# Patient Record
Sex: FEMALE | Race: WHITE | Employment: OTHER | URBAN - METROPOLITAN AREA
[De-identification: names, ages, dates, MRNs, and addresses within clinical notes are randomized per-mention and may not be internally consistent; named-entity substitution may affect disease eponyms.]

---

## 2019-12-26 ENCOUNTER — HOSPITAL ENCOUNTER (EMERGENCY)
Age: 74
Discharge: HOME | End: 2019-12-26
Attending: EMERGENCY MEDICINE
Payer: MEDICARE

## 2019-12-26 ENCOUNTER — APPOINTMENT (OUTPATIENT)
Dept: CT IMAGING | Age: 74
End: 2019-12-26
Payer: MEDICARE

## 2019-12-26 VITALS
HEART RATE: 66 BPM | TEMPERATURE: 97.6 F | HEIGHT: 65 IN | DIASTOLIC BLOOD PRESSURE: 86 MMHG | RESPIRATION RATE: 14 BRPM | BODY MASS INDEX: 27.49 KG/M2 | WEIGHT: 165 LBS | SYSTOLIC BLOOD PRESSURE: 158 MMHG | OXYGEN SATURATION: 96 %

## 2019-12-26 DIAGNOSIS — S01.01XA LACERATION OF SCALP, INITIAL ENCOUNTER: ICD-10-CM

## 2019-12-26 DIAGNOSIS — S09.90XA CLOSED HEAD INJURY, INITIAL ENCOUNTER: Primary | ICD-10-CM

## 2019-12-26 PROCEDURE — 90471 IMMUNIZATION ADMIN: CPT | Performed by: EMERGENCY MEDICINE

## 2019-12-26 PROCEDURE — 6360000002 HC RX W HCPCS: Performed by: EMERGENCY MEDICINE

## 2019-12-26 PROCEDURE — 70450 CT HEAD/BRAIN W/O DYE: CPT

## 2019-12-26 PROCEDURE — 12011 RPR F/E/E/N/L/M 2.5 CM/<: CPT

## 2019-12-26 PROCEDURE — 99284 EMERGENCY DEPT VISIT MOD MDM: CPT

## 2019-12-26 PROCEDURE — 90715 TDAP VACCINE 7 YRS/> IM: CPT | Performed by: EMERGENCY MEDICINE

## 2019-12-26 RX ADMIN — TETANUS TOXOID, REDUCED DIPHTHERIA TOXOID AND ACELLULAR PERTUSSIS VACCINE, ADSORBED 0.5 ML: 5; 2.5; 8; 8; 2.5 SUSPENSION INTRAMUSCULAR at 06:53

## 2019-12-26 ASSESSMENT — PAIN DESCRIPTION - DESCRIPTORS: DESCRIPTORS: THROBBING

## 2019-12-26 ASSESSMENT — ENCOUNTER SYMPTOMS
WHEEZING: 0
SHORTNESS OF BREATH: 0
SORE THROAT: 0
COUGH: 0
PHOTOPHOBIA: 0
DIARRHEA: 0
BACK PAIN: 0
RHINORRHEA: 0
VOMITING: 0
SINUS PAIN: 0
NAUSEA: 0
SINUS PRESSURE: 0
ABDOMINAL PAIN: 0
CONSTIPATION: 0

## 2019-12-26 ASSESSMENT — PAIN DESCRIPTION - ONSET: ONSET: ON-GOING

## 2019-12-26 ASSESSMENT — PAIN SCALES - GENERAL: PAINLEVEL_OUTOF10: 8

## 2019-12-26 ASSESSMENT — PAIN DESCRIPTION - FREQUENCY: FREQUENCY: CONTINUOUS

## 2019-12-26 ASSESSMENT — PAIN DESCRIPTION - LOCATION: LOCATION: HEAD

## 2019-12-26 ASSESSMENT — PAIN DESCRIPTION - PAIN TYPE: TYPE: ACUTE PAIN

## 2019-12-26 ASSESSMENT — PAIN DESCRIPTION - ORIENTATION: ORIENTATION: RIGHT

## 2019-12-26 ASSESSMENT — PAIN DESCRIPTION - PROGRESSION: CLINICAL_PROGRESSION: NOT CHANGED

## 2020-07-25 ENCOUNTER — TELEPHONE ENCOUNTER (OUTPATIENT)
Dept: URBAN - METROPOLITAN AREA CLINIC 13 | Facility: CLINIC | Age: 75
End: 2020-07-25

## 2020-07-25 RX ORDER — METRONIDAZOLE 500 MG/1
TAKE 1 TABLET TWICE DAILY UNTIL FINISHED TABLET ORAL
Qty: 20 | Refills: 0 | OUTPATIENT
Start: 2016-05-23 | End: 2016-06-28

## 2020-07-25 RX ORDER — VANCOMYCIN HYDROCHLORIDE 125 MG/1
TAKE 1 CAPSULE 4 TIMES DAILY CAPSULE ORAL
Qty: 40 | Refills: 1 | OUTPATIENT
Start: 2016-06-02 | End: 2016-06-28

## 2020-07-26 ENCOUNTER — TELEPHONE ENCOUNTER (OUTPATIENT)
Dept: URBAN - METROPOLITAN AREA CLINIC 13 | Facility: CLINIC | Age: 75
End: 2020-07-26

## 2020-07-26 RX ORDER — MULTIVITAMIN
TAKE 1 CAPSULE DAILY CAPSULE ORAL
Refills: 0 | Status: ACTIVE | COMMUNITY

## 2022-01-12 ENCOUNTER — OFFICE VISIT (OUTPATIENT)
Dept: URBAN - METROPOLITAN AREA CLINIC 107 | Facility: CLINIC | Age: 77
End: 2022-01-12

## 2022-03-14 ENCOUNTER — LAB OUTSIDE AN ENCOUNTER (OUTPATIENT)
Dept: URBAN - METROPOLITAN AREA CLINIC 107 | Facility: CLINIC | Age: 77
End: 2022-03-14

## 2022-03-14 ENCOUNTER — WEB ENCOUNTER (OUTPATIENT)
Dept: URBAN - METROPOLITAN AREA CLINIC 107 | Facility: CLINIC | Age: 77
End: 2022-03-14

## 2022-03-14 ENCOUNTER — OFFICE VISIT (OUTPATIENT)
Dept: URBAN - METROPOLITAN AREA CLINIC 107 | Facility: CLINIC | Age: 77
End: 2022-03-14
Payer: MEDICARE

## 2022-03-14 VITALS
SYSTOLIC BLOOD PRESSURE: 171 MMHG | DIASTOLIC BLOOD PRESSURE: 95 MMHG | BODY MASS INDEX: 28.32 KG/M2 | HEART RATE: 92 BPM | TEMPERATURE: 98.1 F | WEIGHT: 170 LBS | HEIGHT: 65 IN | RESPIRATION RATE: 18 BRPM

## 2022-03-14 DIAGNOSIS — M33.90 DERMATOMYOSITIS: ICD-10-CM

## 2022-03-14 DIAGNOSIS — Z80.0 FAMILY HISTORY OF COLON CANCER: ICD-10-CM

## 2022-03-14 DIAGNOSIS — K21.9 GASTROESOPHAGEAL REFLUX DISEASE, UNSPECIFIED WHETHER ESOPHAGITIS PRESENT: ICD-10-CM

## 2022-03-14 PROCEDURE — 99214 OFFICE O/P EST MOD 30 MIN: CPT | Performed by: INTERNAL MEDICINE

## 2022-03-14 RX ORDER — HYDROXYCHLOROQUINE SULFATE 200 MG/1
AS DIRECTED TABLET, FILM COATED ORAL TWICE A DAY
Status: ACTIVE | COMMUNITY

## 2022-03-14 RX ORDER — POLYETHYLENE GLYCOL 3350, SODIUM CHLORIDE, SODIUM BICARBONATE, POTASSIUM CHLORIDE 420; 11.2; 5.72; 1.48 G/4L; G/4L; G/4L; G/4L
AS DIRECTED POWDER, FOR SOLUTION ORAL ONCE
Qty: 1 KIT | Refills: 0 | OUTPATIENT
Start: 2022-03-14 | End: 2022-03-15

## 2022-03-14 RX ORDER — LEVOCETIRIZINE DIHYDROCHLORIDE 5 MG/1
1 TABLET IN THE EVENING TABLET ORAL ONCE A DAY
Status: ACTIVE | COMMUNITY

## 2022-03-14 RX ORDER — MULTIVITAMIN
TAKE 1 CAPSULE DAILY CAPSULE ORAL
Refills: 0 | Status: ACTIVE | COMMUNITY

## 2022-03-14 NOTE — HPI-TODAY'S VISIT:
Ms. Borrero is C. difficile diarrhea and multiple second-degree relatives with colorectal cancer referred by Dr. Dillon for recent diagnosis of dermatomyositis.  In September 2020 she developed a pruritic erythematous rash on her torso and face.  She was seen by dermatology but eventually was evaluated by Dr. Dillon secondary to some associated joint pain.  She was diagnosed with amyopathic dermatomyositis.  Laboratories were notable for positiveanti-PL7 and anti-TIF-1r, the latter having a 78% sensitivity and 89% specificity with cancer.  She was started on hydroxychloroquine.  She reports that her pruritus and rash have resolved.  Her family history is notable for 2 first cousins with colorectal cancer.  Her last colonoscopy on 11/17/2015 by Dr. Miramontes was normal.  She has 3-4 bowel movements daily since taking the hydroxychloroquine.  She denies any red blood per rectum.  She also has a history of heartburn but no dysphagia.  Her weight has been stable.  She is not undergoing upper endoscopy.  She was last seen in our office by Dr. No for evaluation of diarrhea and C. difficle.  She was treated successfully with oral vancomycin.

## 2022-04-06 ENCOUNTER — CLAIMS CREATED FROM THE CLAIM WINDOW (OUTPATIENT)
Dept: URBAN - METROPOLITAN AREA CLINIC 4 | Facility: CLINIC | Age: 77
End: 2022-04-06
Payer: MEDICARE

## 2022-04-06 ENCOUNTER — OFFICE VISIT (OUTPATIENT)
Dept: URBAN - METROPOLITAN AREA SURGERY CENTER 25 | Facility: SURGERY CENTER | Age: 77
End: 2022-04-06
Payer: MEDICARE

## 2022-04-06 DIAGNOSIS — K29.80 PEPTIC DUODENITIS: ICD-10-CM

## 2022-04-06 DIAGNOSIS — K22.8 OTHER SPECIFIED DISEASES OF ESOPHAGUS: ICD-10-CM

## 2022-04-06 DIAGNOSIS — K21.9 GASTROESOPHAGEAL REFLUX DISEASE: ICD-10-CM

## 2022-04-06 DIAGNOSIS — K29.81 DUODENITIS WITH BLEEDING: ICD-10-CM

## 2022-04-06 DIAGNOSIS — K31.89 FOCAL FOVEOLAR HYPERPLASIA: ICD-10-CM

## 2022-04-06 PROCEDURE — 88312 SPECIAL STAINS GROUP 1: CPT | Performed by: PATHOLOGY

## 2022-04-06 PROCEDURE — 88305 TISSUE EXAM BY PATHOLOGIST: CPT | Performed by: PATHOLOGY

## 2022-04-06 PROCEDURE — 43239 EGD BIOPSY SINGLE/MULTIPLE: CPT | Performed by: INTERNAL MEDICINE

## 2022-04-06 PROCEDURE — G8907 PT DOC NO EVENTS ON DISCHARG: HCPCS | Performed by: INTERNAL MEDICINE

## 2022-04-06 RX ORDER — HYDROXYCHLOROQUINE SULFATE 200 MG/1
AS DIRECTED TABLET, FILM COATED ORAL TWICE A DAY
Status: ACTIVE | COMMUNITY

## 2022-04-06 RX ORDER — MULTIVITAMIN
TAKE 1 CAPSULE DAILY CAPSULE ORAL
Refills: 0 | Status: ACTIVE | COMMUNITY

## 2022-04-06 RX ORDER — LEVOCETIRIZINE DIHYDROCHLORIDE 5 MG/1
1 TABLET IN THE EVENING TABLET ORAL ONCE A DAY
Status: ACTIVE | COMMUNITY

## 2022-04-26 ENCOUNTER — OFFICE VISIT (OUTPATIENT)
Dept: URBAN - METROPOLITAN AREA SURGERY CENTER 25 | Facility: SURGERY CENTER | Age: 77
End: 2022-04-26
Payer: MEDICARE

## 2022-04-26 ENCOUNTER — CLAIMS CREATED FROM THE CLAIM WINDOW (OUTPATIENT)
Dept: URBAN - METROPOLITAN AREA CLINIC 4 | Facility: CLINIC | Age: 77
End: 2022-04-26
Payer: MEDICARE

## 2022-04-26 DIAGNOSIS — Z80.0 FAMILY HISTORY OF COLON CANCER: ICD-10-CM

## 2022-04-26 DIAGNOSIS — M33.90 DERMATOMYOSITIS: ICD-10-CM

## 2022-04-26 DIAGNOSIS — D12.2 BENIGN NEOPLASM OF ASCENDING COLON: ICD-10-CM

## 2022-04-26 DIAGNOSIS — K57.30 ACQUIRED DIVERTICULOSIS OF COLON: ICD-10-CM

## 2022-04-26 DIAGNOSIS — D12.2 ADENOMA OF ASCENDING COLON: ICD-10-CM

## 2022-04-26 PROCEDURE — G8907 PT DOC NO EVENTS ON DISCHARG: HCPCS | Performed by: INTERNAL MEDICINE

## 2022-04-26 PROCEDURE — 88305 TISSUE EXAM BY PATHOLOGIST: CPT | Performed by: PATHOLOGY

## 2022-04-26 PROCEDURE — 45385 COLONOSCOPY W/LESION REMOVAL: CPT | Performed by: INTERNAL MEDICINE

## 2022-04-26 RX ORDER — MULTIVITAMIN
TAKE 1 CAPSULE DAILY CAPSULE ORAL
Refills: 0 | Status: ACTIVE | COMMUNITY

## 2022-04-26 RX ORDER — HYDROXYCHLOROQUINE SULFATE 200 MG/1
AS DIRECTED TABLET, FILM COATED ORAL TWICE A DAY
Status: ACTIVE | COMMUNITY

## 2022-04-26 RX ORDER — LEVOCETIRIZINE DIHYDROCHLORIDE 5 MG/1
1 TABLET IN THE EVENING TABLET ORAL ONCE A DAY
Status: ACTIVE | COMMUNITY

## 2022-05-11 ENCOUNTER — OFFICE VISIT (OUTPATIENT)
Dept: URBAN - METROPOLITAN AREA CLINIC 113 | Facility: CLINIC | Age: 77
End: 2022-05-11

## 2022-05-12 ENCOUNTER — OFFICE VISIT (OUTPATIENT)
Dept: URBAN - METROPOLITAN AREA CLINIC 107 | Facility: CLINIC | Age: 77
End: 2022-05-12
Payer: MEDICARE

## 2022-05-12 VITALS
SYSTOLIC BLOOD PRESSURE: 153 MMHG | DIASTOLIC BLOOD PRESSURE: 82 MMHG | BODY MASS INDEX: 27.82 KG/M2 | HEART RATE: 76 BPM | HEIGHT: 65 IN | WEIGHT: 167 LBS | TEMPERATURE: 98.3 F | RESPIRATION RATE: 18 BRPM

## 2022-05-12 DIAGNOSIS — M33.90 DERMATOMYOSITIS: ICD-10-CM

## 2022-05-12 DIAGNOSIS — Z86.010 HX OF ADENOMATOUS COLONIC POLYPS: ICD-10-CM

## 2022-05-12 DIAGNOSIS — Z80.0 FAMILY HISTORY OF COLON CANCER: ICD-10-CM

## 2022-05-12 DIAGNOSIS — K21.00 GASTROESOPHAGEAL REFLUX DISEASE WITH ESOPHAGITIS WITHOUT HEMORRHAGE: ICD-10-CM

## 2022-05-12 DIAGNOSIS — K20.80 LOS ANGELES GRADE B ESOPHAGITIS: ICD-10-CM

## 2022-05-12 PROCEDURE — 99213 OFFICE O/P EST LOW 20 MIN: CPT | Performed by: PHYSICIAN ASSISTANT

## 2022-05-12 RX ORDER — OMEPRAZOLE 40 MG/1
1 CAPSULE 30 MINUTES BEFORE MORNING MEAL CAPSULE, DELAYED RELEASE ORAL ONCE A DAY
Qty: 30 | OUTPATIENT
Start: 2022-05-31

## 2022-05-12 RX ORDER — HYDROXYCHLOROQUINE SULFATE 200 MG/1
AS DIRECTED TABLET, FILM COATED ORAL TWICE A DAY
Status: ACTIVE | COMMUNITY

## 2022-05-12 RX ORDER — MULTIVITAMIN
TAKE 1 CAPSULE DAILY CAPSULE ORAL
Refills: 0 | Status: ACTIVE | COMMUNITY

## 2022-05-12 RX ORDER — LEVOCETIRIZINE DIHYDROCHLORIDE 5 MG/1
1 TABLET IN THE EVENING TABLET ORAL ONCE A DAY
Status: ACTIVE | COMMUNITY

## 2022-05-12 NOTE — HPI-TODAY'S VISIT:
Ms. Borrero is a 77-year-old woman with a history of C. difficile diarrhea and multiple second-degree relatives with colorectal cancer, presenting for follow-up after undergoing colonoscopy and EGD. She was last seen on 3/14/2022 to discuss colonoscopy given her newly diagnosed dermatomyositis (with positive biomarkers TIF 1-Gamma).  She is also planned for EGD at that time as she reported complaints of persistent GERD symptoms. EGD (4/6/2022): [place] grade B reflux esophagitis with no bleeding.  Small hiatal hernia.  Nonerosive gastropathy.  Patchy duodenal erythema with scattered erosions.  Duodenal biopsies revealed active peptic duodenitis without evidence of infectious organisms or sprue.  Negative for dysplasia or malignancy.  Stomach antrum and body biopsies demonstrated no significant abnormality.  GE junction biopsies revealed gastric type mucosa without significant abnormality; no squamous mucosa identified.  No evidence of eosinophilic esophagitis. Colonoscopy (4/26/2022): [place] bowel preparation scale score of 9.  A 5 mm polyp was removed from the ascending colon.  Diverticulosis in the sigmoid colon.  Pathology revealed this to be a tubular adenoma.  A repeat colonoscopy is recommended in 7 years as part of colon cancer prevention. Today she presents in follow-up stating that she is feeling fairly well overall.  She has an extensive list of questions regarding her colonoscopy findings and upper endoscopy findings.  SHe continues to have mild heartburn. She is not on any medications for this. No difficulty swallowing or regurgitation.  She denies any nausea, vomiting, abdominal pain, early satiety, fevers or chills.  Her bowel habits are regular and normal consistency.  She has up to 4 soft, formed bowel movements daily with taking hydroxychloroquine. No red blood per rectum or melena.

## 2022-05-31 PROBLEM — 396230008: Status: ACTIVE | Noted: 2022-03-14

## 2022-05-31 PROBLEM — 312824007: Status: ACTIVE | Noted: 2022-03-14

## 2022-05-31 PROBLEM — 429047008: Status: ACTIVE | Noted: 2022-05-31

## 2022-06-17 ENCOUNTER — OFFICE VISIT (OUTPATIENT)
Dept: URBAN - METROPOLITAN AREA CLINIC 107 | Facility: CLINIC | Age: 77
End: 2022-06-17

## 2022-06-17 NOTE — HPI-OTHER HISTORIES
EGD (4/6/2022): LA grade B reflux esophagitis with no bleeding.  Small hiatal hernia.  Nonerosive gastropathy.  Patchy duodenal erythema with scattered erosions.  Duodenal biopsies revealed active peptic duodenitis without evidence of infectious organisms or sprue.  Negative for dysplasia or malignancy.  Stomach antrum and body biopsies demonstrated no significant abnormality.  GE junction biopsies revealed gastric type mucosa without significant abnormality; no squamous mucosa identified.  No evidence of eosinophilic esophagitis. Colonoscopy (4/26/2022): [place] bowel preparation scale score of 9.  A 5 mm polyp was removed from the ascending colon.  Diverticulosis in the sigmoid colon.  Pathology revealed this to be a tubular adenoma.  A repeat colonoscopy is recommended in 7 years as part of colon cancer prevention.

## 2022-08-24 ENCOUNTER — OFFICE VISIT (OUTPATIENT)
Dept: URBAN - METROPOLITAN AREA CLINIC 107 | Facility: CLINIC | Age: 77
End: 2022-08-24
Payer: MEDICARE

## 2022-08-24 VITALS
SYSTOLIC BLOOD PRESSURE: 173 MMHG | RESPIRATION RATE: 18 BRPM | DIASTOLIC BLOOD PRESSURE: 84 MMHG | BODY MASS INDEX: 27.49 KG/M2 | TEMPERATURE: 97.6 F | HEART RATE: 84 BPM | WEIGHT: 165 LBS | HEIGHT: 65 IN

## 2022-08-24 DIAGNOSIS — K21.00 GASTROESOPHAGEAL REFLUX DISEASE WITH ESOPHAGITIS WITHOUT HEMORRHAGE: ICD-10-CM

## 2022-08-24 DIAGNOSIS — R74.8 ELEVATED LIVER ENZYMES: ICD-10-CM

## 2022-08-24 PROCEDURE — 99214 OFFICE O/P EST MOD 30 MIN: CPT | Performed by: INTERNAL MEDICINE

## 2022-08-24 RX ORDER — OMEPRAZOLE 40 MG/1
1 CAPSULE 30 MINUTES BEFORE MORNING MEAL CAPSULE, DELAYED RELEASE ORAL ONCE A DAY
Qty: 30 | Status: ACTIVE | COMMUNITY
Start: 2022-05-31

## 2022-08-24 RX ORDER — LEVOCETIRIZINE DIHYDROCHLORIDE 5 MG/1
1 TABLET IN THE EVENING TABLET ORAL ONCE A DAY
Status: ACTIVE | COMMUNITY

## 2022-08-24 RX ORDER — MULTIVITAMIN
TAKE 1 CAPSULE DAILY CAPSULE ORAL
Refills: 0 | Status: ACTIVE | COMMUNITY

## 2022-08-24 RX ORDER — HYDROXYCHLOROQUINE SULFATE 200 MG/1
1 TABLET TABLET, FILM COATED ORAL TWICE A DAY
Status: ACTIVE | COMMUNITY

## 2022-08-24 RX ORDER — OMEPRAZOLE 40 MG/1
1 CAPSULE 30 MINUTES BEFORE MORNING MEAL CAPSULE, DELAYED RELEASE ORAL ONCE A DAY
OUTPATIENT
Start: 2022-05-31

## 2022-08-24 NOTE — HPI-OTHER HISTORIES
EGD (4/6/2022): LA grade B reflux esophagitis with no bleeding.  Small hiatal hernia.  Nonerosive gastropathy.  Patchy duodenal erythema with scattered erosions.  Duodenal biopsies revealed active peptic duodenitis without evidence of infectious organisms or sprue.  Negative for dysplasia or malignancy.  Stomach antrum and body biopsies demonstrated no significant abnormality.  GE junction biopsies revealed gastric type mucosa without significant abnormality; no squamous mucosa identified.  No evidence of eosinophilic esophagitis. Colonoscopy (4/26/2022): [place] bowel preparation scale score of 9.  A 5 mm polyp was removed from the ascending colon.  Diverticulosis in the sigmoid colon.  Pathology revealed this to be a tubular adenoma.  A repeat colonoscopy is recommended in 7 years as part of colon cancer prevention.

## 2022-08-24 NOTE — HPI-TODAY'S VISIT:
Ms Borrero is a 77-year-old woman with a history of C. difficile diarrhea and multiple second-degree relatives with colorectal cancer presents for follow-up.  She was last seen on 5/12/2022 for follow-up regarding GERD with esophagitis and dermatomyositis status post EGD and colonoscopy to evaluate for possible occult neoplasm.  The EGD was notable for mild reflux esophagitis, small hiatal hernia and mild acid peptic duodenitis without evidence of H. pylori.  The colonoscopy was unremarkable except for removal of a low grade tubular adenoma.  A surveillance colonoscopy is recommended in 7 years.  She was recommended to begin omeprazole 40 mg daily for GERD symptoms.  She returns for follow-up overall doing fairly well.  We again discussed results of the EGD and colonoscopy in detail.  She had an abdominal ultrasound that reportedly showed increased hepatic echogenicity suggestive of steatosis versus fibrosis.  She denies any nausea, vomiting, heartburn, dysphagia, weight loss, abdominal pain.  Her bowel habits are regular, normal consistency without any red blood per rectum.  No fevers.  Labs on 4/5/2022 are notable for WBC 4.3, hemoglobin 14.5, MCV 91, platelets 284, normal basic metabolic panel, total bilirubin 0.6, alkaline phosphatase 69, AST 30, ALT 35, albumin 4.6, total protein 7.6.

## 2022-09-08 PROBLEM — 266433003: Status: ACTIVE | Noted: 2022-05-31

## 2022-09-09 ENCOUNTER — TELEPHONE ENCOUNTER (OUTPATIENT)
Dept: URBAN - METROPOLITAN AREA CLINIC 107 | Facility: CLINIC | Age: 77
End: 2022-09-09

## 2022-09-26 ENCOUNTER — TELEPHONE ENCOUNTER (OUTPATIENT)
Dept: URBAN - METROPOLITAN AREA CLINIC 113 | Facility: CLINIC | Age: 77
End: 2022-09-26

## 2022-10-17 ENCOUNTER — TELEPHONE ENCOUNTER (OUTPATIENT)
Dept: URBAN - METROPOLITAN AREA CLINIC 107 | Facility: CLINIC | Age: 77
End: 2022-10-17

## 2022-11-02 ENCOUNTER — OFFICE VISIT (OUTPATIENT)
Dept: URBAN - METROPOLITAN AREA CLINIC 107 | Facility: CLINIC | Age: 77
End: 2022-11-02
Payer: MEDICARE

## 2022-11-02 VITALS
WEIGHT: 166 LBS | HEART RATE: 84 BPM | HEIGHT: 65 IN | BODY MASS INDEX: 27.66 KG/M2 | DIASTOLIC BLOOD PRESSURE: 75 MMHG | TEMPERATURE: 98.4 F | SYSTOLIC BLOOD PRESSURE: 164 MMHG | RESPIRATION RATE: 18 BRPM

## 2022-11-02 DIAGNOSIS — R93.2 ABNORMAL LIVER ULTRASOUND: ICD-10-CM

## 2022-11-02 DIAGNOSIS — R74.8 ELEVATED LIVER ENZYMES: ICD-10-CM

## 2022-11-02 PROCEDURE — 99213 OFFICE O/P EST LOW 20 MIN: CPT | Performed by: INTERNAL MEDICINE

## 2022-11-02 RX ORDER — HYDROXYCHLOROQUINE SULFATE 200 MG/1
1 TABLET TABLET, FILM COATED ORAL TWICE A DAY
Status: ACTIVE | COMMUNITY

## 2022-11-02 RX ORDER — LEVOCETIRIZINE DIHYDROCHLORIDE 5 MG/1
1 TABLET IN THE EVENING TABLET ORAL ONCE A DAY
Status: ACTIVE | COMMUNITY

## 2022-11-02 RX ORDER — MULTIVITAMIN
TAKE 1 CAPSULE DAILY CAPSULE ORAL
Refills: 0 | Status: ACTIVE | COMMUNITY

## 2022-11-02 NOTE — HPI-TODAY'S VISIT:
Ms Borrero is a 77-year-old woman with a history of C. difficile diarrhea and multiple second-degree relatives with colorectal cancer presents for follow-up regarding elvated liver enzymes.  She was last seen on 8/24/2022 for follow-up regarding elevated liver enzymes with imaging showing hepatic steatosis and a question of fibrosis.  An MRI of the liver with elastography was discussed, but not completed yet due to lack of availability.  She returns for follow-up doing well.  She denies any nausea, vomiting, abdominal pain.  No change in bowel habits, melena or red blood per rectum.  No heartburn, regurgitation or dysphagia.  She is not taking any antacids.

## 2022-11-02 NOTE — HPI-OTHER HISTORIES
EGD (4/6/2022): LA grade B reflux esophagitis with no bleeding.  Small hiatal hernia.  Nonerosive gastropathy.  Patchy duodenal erythema with scattered erosions.  Duodenal biopsies revealed active peptic duodenitis without evidence of infectious organisms or sprue.  Negative for dysplasia or malignancy.  Stomach antrum and body biopsies demonstrated no significant abnormality.  GE junction biopsies revealed gastric type mucosa without significant abnormality; no squamous mucosa identified.  No evidence of eosinophilic esophagitis. Colonoscopy (4/26/2022): [place] bowel preparation scale score of 9.  A 5 mm polyp was removed from the ascending colon.  Diverticulosis in the sigmoid colon.  Pathology revealed this to be a tubular adenoma.  A repeat colonoscopy is recommended in 7 years as part of colon cancer prevention.

## 2022-11-17 PROBLEM — 707724006: Status: ACTIVE | Noted: 2022-11-17

## 2022-11-17 PROBLEM — 235595009: Status: ACTIVE | Noted: 2022-03-14

## 2022-11-17 PROBLEM — 15633881000119102: Status: ACTIVE | Noted: 2022-11-17

## 2022-12-05 ENCOUNTER — LAB OUTSIDE AN ENCOUNTER (OUTPATIENT)
Dept: URBAN - METROPOLITAN AREA CLINIC 107 | Facility: CLINIC | Age: 77
End: 2022-12-05

## 2023-01-17 ENCOUNTER — TELEPHONE ENCOUNTER (OUTPATIENT)
Dept: URBAN - METROPOLITAN AREA CLINIC 113 | Facility: CLINIC | Age: 78
End: 2023-01-17

## 2023-02-05 ENCOUNTER — TELEPHONE ENCOUNTER (OUTPATIENT)
Dept: URBAN - METROPOLITAN AREA CLINIC 113 | Facility: CLINIC | Age: 78
End: 2023-02-05

## 2023-04-20 ENCOUNTER — OFFICE VISIT (OUTPATIENT)
Dept: URBAN - METROPOLITAN AREA CLINIC 107 | Facility: CLINIC | Age: 78
End: 2023-04-20
Payer: MEDICARE

## 2023-04-20 VITALS
HEIGHT: 65 IN | WEIGHT: 162 LBS | SYSTOLIC BLOOD PRESSURE: 139 MMHG | BODY MASS INDEX: 26.99 KG/M2 | HEART RATE: 72 BPM | RESPIRATION RATE: 18 BRPM | DIASTOLIC BLOOD PRESSURE: 74 MMHG | TEMPERATURE: 97.8 F

## 2023-04-20 DIAGNOSIS — K76.0 NAFLD (NONALCOHOLIC FATTY LIVER DISEASE): ICD-10-CM

## 2023-04-20 DIAGNOSIS — K59.1 FUNCTIONAL DIARRHEA: ICD-10-CM

## 2023-04-20 DIAGNOSIS — R74.8 ELEVATED LIVER ENZYMES: ICD-10-CM

## 2023-04-20 PROCEDURE — 99213 OFFICE O/P EST LOW 20 MIN: CPT | Performed by: INTERNAL MEDICINE

## 2023-04-20 RX ORDER — MULTIVITAMIN
TAKE 1 CAPSULE DAILY CAPSULE ORAL
Refills: 0 | Status: ACTIVE | COMMUNITY

## 2023-04-20 RX ORDER — HYDROXYCHLOROQUINE SULFATE 200 MG/1
1 TABLET TABLET, FILM COATED ORAL TWICE A DAY
Status: ACTIVE | COMMUNITY

## 2023-04-20 RX ORDER — LEVOCETIRIZINE DIHYDROCHLORIDE 5 MG/1
1 TABLET IN THE EVENING TABLET ORAL ONCE A DAY
Status: ACTIVE | COMMUNITY

## 2023-04-20 NOTE — HPI-OTHER HISTORIES
EGD (4/6/2022): LA grade B reflux esophagitis with no bleeding.  Small hiatal hernia.  Nonerosive gastropathy.  Patchy duodenal erythema with scattered erosions.  Duodenal biopsies revealed active peptic duodenitis without evidence of infectious organisms or sprue.  Negative for dysplasia or malignancy.  Stomach antrum and body biopsies demonstrated no significant abnormality.  GE junction biopsies revealed gastric type mucosa without significant abnormality; no squamous mucosa identified.  No evidence of eosinophilic esophagitis. Colonoscopy (4/26/2022): [place] bowel preparation scale score of 9.  A 5 mm polyp was removed from the ascending colon.  Diverticulosis in the sigmoid colon.  Pathology revealed this to be a tubular adenoma.  A repeat colonoscopy is recommended in 7 years as part of colon cancer prevention.

## 2023-04-20 NOTE — HPI-TODAY'S VISIT:
Ms Borrero is a 78-year-old woman with a history of C. difficile diarrhea and multiple second-degree relatives with colorectal cancer presents for follow-up regarding elevated liver enzymes secondary to NAFLD.  She under went an MRI of the abdomen with contrast but unfortunately this did not include elastography.  Findings were notable for a 6% steatosis and no choledocholithiasis or liver tumor.  She is overall doing well.  She has had some issues with diarrhea that seem to be related to psychosocial stressors related to her 's cancer.  She reports that she may have up to 5-6 bowel movements daily.  She denies any blood per rectum or nocturnal diarrhea.  No abdominal pain or weight loss.

## 2023-05-04 PROBLEM — 1231824009: Status: ACTIVE | Noted: 2023-05-04

## 2023-06-28 ENCOUNTER — OFFICE VISIT (OUTPATIENT)
Dept: URBAN - METROPOLITAN AREA CLINIC 113 | Facility: CLINIC | Age: 78
End: 2023-06-28

## 2023-10-03 ENCOUNTER — OFFICE VISIT (OUTPATIENT)
Dept: URBAN - METROPOLITAN AREA CLINIC 107 | Facility: CLINIC | Age: 78
End: 2023-10-03
Payer: MEDICARE

## 2023-10-03 VITALS
HEART RATE: 76 BPM | DIASTOLIC BLOOD PRESSURE: 80 MMHG | TEMPERATURE: 97.8 F | WEIGHT: 152 LBS | HEIGHT: 65 IN | BODY MASS INDEX: 25.33 KG/M2 | RESPIRATION RATE: 18 BRPM | SYSTOLIC BLOOD PRESSURE: 142 MMHG

## 2023-10-03 DIAGNOSIS — R19.7 DIARRHEA, UNSPECIFIED TYPE: ICD-10-CM

## 2023-10-03 DIAGNOSIS — K76.0 NAFLD (NONALCOHOLIC FATTY LIVER DISEASE): ICD-10-CM

## 2023-10-03 DIAGNOSIS — Z86.010 HX OF ADENOMATOUS COLONIC POLYPS: ICD-10-CM

## 2023-10-03 PROCEDURE — 99213 OFFICE O/P EST LOW 20 MIN: CPT | Performed by: INTERNAL MEDICINE

## 2023-10-03 RX ORDER — MULTIVITAMIN
TAKE 1 CAPSULE DAILY CAPSULE ORAL
Refills: 0 | Status: ACTIVE | COMMUNITY

## 2023-10-03 RX ORDER — HYDROXYCHLOROQUINE SULFATE 200 MG/1
1 TABLET TABLET, FILM COATED ORAL TWICE A DAY
Status: ACTIVE | COMMUNITY

## 2023-10-03 RX ORDER — LEVOCETIRIZINE DIHYDROCHLORIDE 5 MG/1
1 TABLET IN THE EVENING TABLET ORAL ONCE A DAY
Status: ACTIVE | COMMUNITY

## 2023-10-03 NOTE — HPI-OTHER HISTORIES
MRI of the abdomen with contrast but unfortunately this did not include elastography (1/19/23). Findings were notable for a 6% steatosis and no choledocholithiasis or liver tumor.   EGD (4/6/2022): [place] grade B reflux esophagitis with no bleeding.  Small hiatal hernia.  Nonerosive gastropathy.  Patchy duodenal erythema with scattered erosions.  Duodenal biopsies revealed active peptic duodenitis without evidence of infectious organisms or sprue.  Negative for dysplasia or malignancy.  Stomach antrum and body biopsies demonstrated no significant abnormality.  GE junction biopsies revealed gastric type mucosa without significant abnormality; no squamous mucosa identified.  No evidence of eosinophilic esophagitis. Colonoscopy (4/26/2022): [place] bowel preparation scale score of 9.  A 5 mm polyp was removed from the ascending colon.  Diverticulosis in the sigmoid colon.  Pathology revealed this to be a tubular adenoma.  A repeat colonoscopy is recommended in 7 years as part of colon cancer prevention.

## 2023-10-19 PROBLEM — 62315008: Status: ACTIVE | Noted: 2023-10-19

## 2024-02-22 ENCOUNTER — OV EP (OUTPATIENT)
Dept: URBAN - METROPOLITAN AREA CLINIC 107 | Facility: CLINIC | Age: 79
End: 2024-02-22

## 2024-05-15 ENCOUNTER — DASHBOARD ENCOUNTERS (OUTPATIENT)
Age: 79
End: 2024-05-15

## 2024-05-15 ENCOUNTER — OFFICE VISIT (OUTPATIENT)
Dept: URBAN - METROPOLITAN AREA CLINIC 113 | Facility: CLINIC | Age: 79
End: 2024-05-15
Payer: MEDICARE

## 2024-05-15 VITALS
DIASTOLIC BLOOD PRESSURE: 79 MMHG | WEIGHT: 152 LBS | TEMPERATURE: 97.9 F | RESPIRATION RATE: 16 BRPM | SYSTOLIC BLOOD PRESSURE: 158 MMHG | BODY MASS INDEX: 25.33 KG/M2 | HEART RATE: 78 BPM | HEIGHT: 65 IN

## 2024-05-15 DIAGNOSIS — K63.8219 SMALL INTESTINAL BACTERIAL OVERGROWTH (SIBO): ICD-10-CM

## 2024-05-15 DIAGNOSIS — R79.89 ELEVATED FERRITIN: ICD-10-CM

## 2024-05-15 PROCEDURE — 99214 OFFICE O/P EST MOD 30 MIN: CPT | Performed by: INTERNAL MEDICINE

## 2024-05-15 RX ORDER — RIFAXIMIN 550 MG/1
1 TABLET TABLET ORAL THREE TIMES A DAY
Qty: 42 TABLET | Refills: 1 | OUTPATIENT
Start: 2024-05-15 | End: 2024-06-12

## 2024-05-15 RX ORDER — ZOLPIDEM TARTRATE 5 MG/1
1 TABLET AT BEDTIME AS NEEDED TABLET, FILM COATED ORAL ONCE A DAY
Status: ACTIVE | COMMUNITY

## 2024-05-15 RX ORDER — HYDROXYCHLOROQUINE SULFATE 200 MG/1
1 TABLET TABLET, FILM COATED ORAL TWICE A DAY
Status: ACTIVE | COMMUNITY

## 2024-05-15 RX ORDER — LEVOCETIRIZINE DIHYDROCHLORIDE 5 MG/1
1 TABLET IN THE EVENING TABLET ORAL ONCE A DAY
Status: ON HOLD | COMMUNITY

## 2024-05-15 RX ORDER — MULTIVITAMIN
TAKE 1 CAPSULE DAILY CAPSULE ORAL
Refills: 0 | Status: ACTIVE | COMMUNITY

## 2024-05-29 LAB — HEREDITARY HEMOCHROMATOSIS DNA MUT: (no result)

## 2024-06-17 ENCOUNTER — TELEPHONE ENCOUNTER (OUTPATIENT)
Dept: URBAN - METROPOLITAN AREA CLINIC 113 | Facility: CLINIC | Age: 79
End: 2024-06-17

## 2024-06-25 ENCOUNTER — TELEPHONE ENCOUNTER (OUTPATIENT)
Dept: URBAN - METROPOLITAN AREA CLINIC 113 | Facility: CLINIC | Age: 79
End: 2024-06-25

## 2024-06-28 ENCOUNTER — TELEPHONE ENCOUNTER (OUTPATIENT)
Dept: URBAN - METROPOLITAN AREA CLINIC 113 | Facility: CLINIC | Age: 79
End: 2024-06-28

## 2024-07-29 ENCOUNTER — OFFICE VISIT (OUTPATIENT)
Dept: URBAN - METROPOLITAN AREA CLINIC 113 | Facility: CLINIC | Age: 79
End: 2024-07-29

## 2024-07-29 VITALS
BODY MASS INDEX: 24.49 KG/M2 | HEIGHT: 65 IN | WEIGHT: 147 LBS | DIASTOLIC BLOOD PRESSURE: 79 MMHG | RESPIRATION RATE: 18 BRPM | TEMPERATURE: 97.2 F | HEART RATE: 75 BPM | SYSTOLIC BLOOD PRESSURE: 145 MMHG

## 2024-07-29 PROBLEM — 429047008: Status: ACTIVE | Noted: 2024-07-29

## 2024-07-29 PROBLEM — 365799007: Status: ACTIVE | Noted: 2024-07-29

## 2024-07-29 RX ORDER — ZOLPIDEM TARTRATE 5 MG/1
1 TABLET AT BEDTIME AS NEEDED TABLET, FILM COATED ORAL ONCE A DAY
Status: ACTIVE | COMMUNITY

## 2024-07-29 RX ORDER — LEVOCETIRIZINE DIHYDROCHLORIDE 5 MG/1
1 TABLET IN THE EVENING TABLET ORAL ONCE A DAY
Status: ON HOLD | COMMUNITY

## 2024-07-29 RX ORDER — MULTIVITAMIN
TAKE 1 CAPSULE DAILY CAPSULE ORAL
Refills: 0 | Status: ACTIVE | COMMUNITY

## 2024-07-29 RX ORDER — HYDROXYCHLOROQUINE SULFATE 200 MG/1
1 TABLET TABLET, FILM COATED ORAL TWICE A DAY
Status: ACTIVE | COMMUNITY

## 2024-07-29 NOTE — HPI-OTHER HISTORIES
MRI of the abdomen with contrast but unfortunately this did not include elastography (1/19/23). Findings were notable for a 6% steatosis and no choledocholithiasis or liver tumor.  EGD (4/6/2022): [place] grade B reflux esophagitis with no bleeding.  Small hiatal hernia.  Nonerosive gastropathy.  Patchy duodenal erythema with scattered erosions.  Duodenal biopsies revealed active peptic duodenitis without evidence of infectious organisms or sprue.  Negative for dysplasia or malignancy.  Stomach antrum and body biopsies demonstrated no significant abnormality.  GE junction biopsies revealed gastric type mucosa without significant abnormality; no squamous mucosa identified.  No evidence of eosinophilic esophagitis. Colonoscopy (4/26/2022): [place] bowel preparation scale score of 9.  A 5 mm polyp was removed from the ascending colon.  Diverticulosis in the sigmoid colon.  Pathology revealed this to be a tubular adenoma.  A repeat colonoscopy is recommended in 7 years as part of colon cancer prevention.

## 2024-07-29 NOTE — HPI-TODAY'S VISIT:
Ms Borrero is a 79-year-old woman with a history of C. difficile diarrhea and multiple second-degree relatives with colorectal cancer presents for follow-up regarding elevated liver enzymes secondary to NAFLD and diarrhea predominant change in bowel habits.

## 2024-10-16 ENCOUNTER — TELEPHONE ENCOUNTER (OUTPATIENT)
Dept: URBAN - METROPOLITAN AREA CLINIC 113 | Facility: CLINIC | Age: 79
End: 2024-10-16

## 2024-11-22 ENCOUNTER — TELEPHONE ENCOUNTER (OUTPATIENT)
Dept: URBAN - METROPOLITAN AREA CLINIC 113 | Facility: CLINIC | Age: 79
End: 2024-11-22

## 2025-02-19 ENCOUNTER — LAB OUTSIDE AN ENCOUNTER (OUTPATIENT)
Dept: URBAN - METROPOLITAN AREA CLINIC 113 | Facility: CLINIC | Age: 80
End: 2025-02-19

## 2025-02-19 ENCOUNTER — OFFICE VISIT (OUTPATIENT)
Dept: URBAN - METROPOLITAN AREA CLINIC 113 | Facility: CLINIC | Age: 80
End: 2025-02-19

## 2025-02-19 VITALS
WEIGHT: 152.6 LBS | TEMPERATURE: 96 F | RESPIRATION RATE: 18 BRPM | SYSTOLIC BLOOD PRESSURE: 158 MMHG | DIASTOLIC BLOOD PRESSURE: 63 MMHG | HEART RATE: 79 BPM | HEIGHT: 65 IN | BODY MASS INDEX: 25.43 KG/M2

## 2025-02-19 PROBLEM — 722866000: Status: ACTIVE | Noted: 2025-02-19

## 2025-02-19 RX ORDER — MULTIVITAMIN
TAKE 1 CAPSULE DAILY CAPSULE ORAL
Refills: 0 | Status: ON HOLD | COMMUNITY

## 2025-02-19 RX ORDER — FAMOTIDINE 10 MG/1
1 TABLET AS NEEDED TABLET, FILM COATED ORAL DAILY
Status: ACTIVE | COMMUNITY
Start: 2024-08-14

## 2025-02-19 RX ORDER — ERGOCALCIFEROL CAPSULES, 1.25 MG/1
1 CAPSULE CAPSULE ORAL
Status: ACTIVE | COMMUNITY

## 2025-02-19 RX ORDER — LEVOCETIRIZINE DIHYDROCHLORIDE 5 MG/1
1 TABLET IN THE EVENING TABLET ORAL ONCE A DAY
Status: ON HOLD | COMMUNITY

## 2025-02-19 RX ORDER — HYDROXYCHLOROQUINE SULFATE 200 MG/1
1 TABLET TABLET, FILM COATED ORAL TWICE A DAY
Status: ACTIVE | COMMUNITY

## 2025-02-19 RX ORDER — ZOLPIDEM TARTRATE 5 MG/1
1 TABLET AT BEDTIME AS NEEDED TABLET, FILM COATED ORAL ONCE A DAY
Status: ACTIVE | COMMUNITY

## 2025-02-19 NOTE — HPI-TODAY'S VISIT:
Ms Borrero is a 79-year-old woman with a history of dermatomyositis and multiple second-degree relatives with colorectal cancer presents for follow-up regarding elevated liver enzymes secondary to NAFLD, elevated ferritin, GERD and diarrhea predominant change in bowel habits.

## 2025-02-19 NOTE — HPI-OTHER HISTORIES
MRI of the abdomen with contrast but unfortunately this did not include elastography (1/19/23). Findings were notable for a 6% steatosis and no choledocholithiasis or liver tumor.  EGD (4/6/2022): [place] grade B reflux esophagitis with no bleeding.  Small hiatal hernia.  Nonerosive gastropathy.  Patchy duodenal erythema with scattered erosions.  Duodenal biopsies revealed active peptic duodenitis without evidence of infectious organisms or sprue.  Negative for dysplasia or malignancy.  Stomach antrum and body biopsies demonstrated no significant abnormality.  GE junction biopsies revealed gastric type mucosa without significant abnormality; no squamous mucosa identified.  No evidence of eosinophilic esophagitis. Colonoscopy (4/26/2022): [place] bowel preparation scale score of 9.  A 5 mm ascending colon tubular adenoma, sigmoid colon diverticulosis. A repeat colonoscopy is recommended in 5 years as part of colon cancer prevention.

## 2025-02-21 ENCOUNTER — CLAIMS CREATED FROM THE CLAIM WINDOW (OUTPATIENT)
Dept: URBAN - METROPOLITAN AREA CLINIC 117 | Facility: CLINIC | Age: 80
End: 2025-02-21
Payer: MEDICARE

## 2025-02-21 ENCOUNTER — OFFICE VISIT (OUTPATIENT)
Dept: URBAN - METROPOLITAN AREA CLINIC 112 | Facility: CLINIC | Age: 80
End: 2025-02-21
Payer: MEDICARE

## 2025-02-21 VITALS — HEIGHT: 65 IN | BODY MASS INDEX: 25.62 KG/M2 | WEIGHT: 153.8 LBS

## 2025-02-21 DIAGNOSIS — K74.00 FIBROSIS OF LIVER: ICD-10-CM

## 2025-02-21 DIAGNOSIS — K74.01 EARLY HEPATIC FIBROSIS: ICD-10-CM

## 2025-02-21 PROCEDURE — 76981 USE PARENCHYMA: CPT | Performed by: INTERNAL MEDICINE

## 2025-02-21 RX ORDER — LEVOCETIRIZINE DIHYDROCHLORIDE 5 MG/1
1 TABLET IN THE EVENING TABLET ORAL ONCE A DAY
Status: ON HOLD | COMMUNITY

## 2025-02-21 RX ORDER — HYDROXYCHLOROQUINE SULFATE 200 MG/1
1 TABLET TABLET, FILM COATED ORAL TWICE A DAY
Status: ACTIVE | COMMUNITY

## 2025-02-21 RX ORDER — FAMOTIDINE 10 MG/1
1 TABLET AS NEEDED TABLET, FILM COATED ORAL DAILY
Status: ACTIVE | COMMUNITY
Start: 2024-08-14

## 2025-02-21 RX ORDER — ERGOCALCIFEROL CAPSULES, 1.25 MG/1
1 CAPSULE CAPSULE ORAL
Status: ACTIVE | COMMUNITY

## 2025-02-21 RX ORDER — ZOLPIDEM TARTRATE 5 MG/1
1 TABLET AT BEDTIME AS NEEDED TABLET, FILM COATED ORAL ONCE A DAY
Status: ACTIVE | COMMUNITY

## 2025-02-21 RX ORDER — MULTIVITAMIN
TAKE 1 CAPSULE DAILY CAPSULE ORAL
Refills: 0 | Status: ON HOLD | COMMUNITY

## 2025-02-25 LAB
ALBUMIN/GLOBULIN RATIO: 1.1
ALBUMIN: 4.4
ALKALINE PHOSPHATASE: 58
ALT: 23
AST: 27
BILIRUBIN, DIRECT: 0.1
BILIRUBIN, INDIRECT: 0.5
BILIRUBIN, TOTAL: 0.6
ENHANCED LIVER FIBROSIS (ELF) SCORE: 10.04
FIB 4 INDEX: 1.49
FIB 4 INTERPRETATION: (no result)
GLOBULIN: 4
PLATELET COUNT: 294
PROTEIN, TOTAL: 8.4

## 2025-03-06 ENCOUNTER — TELEPHONE ENCOUNTER (OUTPATIENT)
Dept: URBAN - METROPOLITAN AREA CLINIC 113 | Facility: CLINIC | Age: 80
End: 2025-03-06